# Patient Record
Sex: FEMALE | Race: WHITE | NOT HISPANIC OR LATINO | URBAN - METROPOLITAN AREA
[De-identification: names, ages, dates, MRNs, and addresses within clinical notes are randomized per-mention and may not be internally consistent; named-entity substitution may affect disease eponyms.]

---

## 2017-04-04 ENCOUNTER — AMBULATORY - HEALTHEAST (OUTPATIENT)
Dept: FAMILY MEDICINE | Facility: CLINIC | Age: 22
End: 2017-04-04

## 2017-04-25 ENCOUNTER — COMMUNICATION - HEALTHEAST (OUTPATIENT)
Dept: FAMILY MEDICINE | Facility: CLINIC | Age: 22
End: 2017-04-25

## 2017-07-21 ENCOUNTER — COMMUNICATION - HEALTHEAST (OUTPATIENT)
Dept: SCHEDULING | Facility: CLINIC | Age: 22
End: 2017-07-21

## 2017-07-24 ENCOUNTER — COMMUNICATION - HEALTHEAST (OUTPATIENT)
Dept: FAMILY MEDICINE | Facility: CLINIC | Age: 22
End: 2017-07-24

## 2017-07-25 ENCOUNTER — OFFICE VISIT - HEALTHEAST (OUTPATIENT)
Dept: FAMILY MEDICINE | Facility: CLINIC | Age: 22
End: 2017-07-25

## 2017-07-25 DIAGNOSIS — Z00.00 ROUTINE GENERAL MEDICAL EXAMINATION AT A HEALTH CARE FACILITY: ICD-10-CM

## 2017-07-25 DIAGNOSIS — Z11.3 SCREEN FOR STD (SEXUALLY TRANSMITTED DISEASE): ICD-10-CM

## 2017-07-25 DIAGNOSIS — Z12.4 SCREENING FOR CERVICAL CANCER: ICD-10-CM

## 2017-07-25 ASSESSMENT — MIFFLIN-ST. JEOR: SCORE: 1449.84

## 2017-07-30 ENCOUNTER — COMMUNICATION - HEALTHEAST (OUTPATIENT)
Dept: FAMILY MEDICINE | Facility: CLINIC | Age: 22
End: 2017-07-30

## 2017-07-31 RX ORDER — MONTELUKAST SODIUM 10 MG/1
TABLET ORAL
Qty: 90 TABLET | Refills: 1 | Status: SHIPPED | OUTPATIENT
Start: 2017-07-31

## 2017-08-02 LAB
BKR LAB AP ABNORMAL BLEEDING: NO
BKR LAB AP BIRTH CONTROL/HORMONES: ABNORMAL
BKR LAB AP CERVICAL APPEARANCE: ABNORMAL
BKR LAB AP GYN ADEQUACY: ABNORMAL
BKR LAB AP GYN INTERPRETATION: ABNORMAL
BKR LAB AP HPV REFLEX: ABNORMAL
BKR LAB AP LMP: ABNORMAL
BKR LAB AP PATIENT STATUS: ABNORMAL
BKR LAB AP PREVIOUS ABNORMAL: ABNORMAL
BKR LAB AP PREVIOUS NORMAL: ABNORMAL
HIGH RISK?: NO
INTERPRETING LAB: ABNORMAL
PATH REPORT.COMMENTS IMP SPEC: ABNORMAL
RESULT FLAG (HE HISTORICAL CONVERSION): ABNORMAL

## 2017-08-04 LAB
HPV INTERPRETATION - HISTORICAL: NORMAL
HPV INTERPRETER - HISTORICAL: NORMAL

## 2017-12-06 ENCOUNTER — OFFICE VISIT - HEALTHEAST (OUTPATIENT)
Dept: FAMILY MEDICINE | Facility: CLINIC | Age: 22
End: 2017-12-06

## 2017-12-06 DIAGNOSIS — J32.9 SINUSITIS: ICD-10-CM

## 2017-12-06 DIAGNOSIS — J02.9 SORE THROAT: ICD-10-CM

## 2018-09-29 ENCOUNTER — COMMUNICATION - HEALTHEAST (OUTPATIENT)
Dept: FAMILY MEDICINE | Facility: CLINIC | Age: 23
End: 2018-09-29

## 2018-10-01 ENCOUNTER — COMMUNICATION - HEALTHEAST (OUTPATIENT)
Dept: FAMILY MEDICINE | Facility: CLINIC | Age: 23
End: 2018-10-01

## 2018-10-01 RX ORDER — ETONOGESTREL AND ETHINYL ESTRADIOL VAGINAL RING .015; .12 MG/D; MG/D
RING VAGINAL
Qty: 3 EACH | Refills: 4 | Status: SHIPPED | OUTPATIENT
Start: 2018-10-01

## 2018-11-01 ENCOUNTER — RECORDS - HEALTHEAST (OUTPATIENT)
Dept: ADMINISTRATIVE | Facility: OTHER | Age: 23
End: 2018-11-01

## 2019-02-14 ENCOUNTER — AMBULATORY - HEALTHEAST (OUTPATIENT)
Dept: FAMILY MEDICINE | Facility: CLINIC | Age: 24
End: 2019-02-14

## 2021-05-31 VITALS — HEIGHT: 64 IN | BODY MASS INDEX: 27.47 KG/M2 | WEIGHT: 160.9 LBS

## 2021-05-31 VITALS — BODY MASS INDEX: 27 KG/M2 | WEIGHT: 157.3 LBS

## 2021-06-12 NOTE — PROGRESS NOTES
Assessment/Plan:      Healthy female exam.   1. Contraception  - etonogestrel-ethinyl estradiol (NUVARING) 0.12-0.015 mg/24 hr vaginal ring; INSERT 1 RING INTRAVAGINALLY & LEAVE IN FOR 3WEEKS, THEN REMOVE FOR 1 WEEK BEFORE INSERTING NEW RING  Dispense: 3 each; Refill: 4    2. Screening for cervical cancer  - Gynecologic Cytology (PAP Smear)    3. Screen for STD (sexually transmitted disease)  Routine based on her age.  - Chlamydia trachomatis DNA, AMP Probe    4. Routine general medical examination at a health care facility  The patient is overall doing well and adheres to a healthy diet as well as working routine exercise into her program.  She is up-to-date on her immunizations.  We talked about vitamin D supplementation for bone health.       Subjective:      Bella Blood is a 22 y.o. female who presents for an annual exam.  The patient is doing well without any specific complaints or concerns.  She is due for a Pap smear today and would like a refill on her birth control.      Healthy Habits:   Regular Exercise: Yes  Sunscreen Use: Yes  Healthy Diet: Yes  Dental Visits Regularly: Yes  Seat Belt: Yes  Sexually active: Yes  Colonoscopy: N/A  Lipid Profile: Yes  Glucose Screen: N/A  Prevention of Osteoporosis: No  Last Dexa: N/A      Immunization History   Administered Date(s) Administered     DT (pediatric) 02/23/2006     DTP / HiB 1994, 1994, 1994, 06/02/1995     Dtap 04/15/1999     HPV Quadrivalent 04/23/2007, 06/28/2007, 10/30/2007     Hep B, historic 1994, 1994, 1994     IPV 1994, 1994, 06/02/1995, 04/15/1999     MMR 06/02/1995, 04/15/1999     Meningococcal MCV40 04/23/2007     Td, historic 02/23/2006     Tdap 10/26/2012     Varicella 08/20/1999, 06/28/2007     Immunization status: up to date and documented.    Gynecologic History  Patient's last menstrual period was 07/09/2017.  Contraception: NuvaRing vaginal inserts  Last Pap: 2015. Results were:  normal  Last mammogram: N/A.       OB History   No data available       Current Outpatient Prescriptions   Medication Sig Dispense Refill     etonogestrel-ethinyl estradiol (NUVARING) 0.12-0.015 mg/24 hr vaginal ring INSERT 1 RING INTRAVAGINALLY & LEAVE IN FOR 3WEEKS, THEN REMOVE FOR 1 WEEK BEFORE INSERTING NEW RING 3 each 4     fluticasone (FLONASE) 50 mcg/actuation nasal spray 1 spray into each nostril 2 times a day at 6:00 am and 4:00 pm. 18 g 2     No current facility-administered medications for this visit.      No past medical history on file.  Past Surgical History:   Procedure Laterality Date     TONSILLECTOMY AND ADENOIDECTOMY       Review of patient's allergies indicates no known allergies.  Family History   Problem Relation Age of Onset     Hypertension Mother      Hypertension Father      Allergic rhinitis Father      Diabetes Maternal Grandmother      Diabetes Maternal Grandfather      Cancer Paternal Grandfather      esophageal     Social History     Social History     Marital status: Single     Spouse name: N/A     Number of children: N/A     Years of education: N/A     Occupational History     Not on file.     Social History Main Topics     Smoking status: Never Smoker     Smokeless tobacco: Never Used     Alcohol use Not on file     Drug use: Not on file     Sexual activity: Not on file     Other Topics Concern     Not on file     Social History Narrative       Review of Systems  General:  Denies problem  Eyes: Denies problem  Ears/Nose/Throat: Denies problem  Cardiovascular: Denies problem  Respiratory:  Denies problem  Gastrointestinal:  Denies problem, Genitourinary: Denies problem  Musculoskeletal:  Denies problem  Skin: Denies problem  Neurologic: Denies problem  Psychiatric: Denies problem  Endocrine: Denies problem  Heme/Lymphatic: Denies problem   Allergic/Immunologic: Denies problem        Objective:         Vitals:    07/25/17 1521   BP: 110/72   Pulse: 76   SpO2: 99%   Weight: 160 lb 14.4  "oz (73 kg)   Height: 5' 4\" (1.626 m)     Body mass index is 27.62 kg/(m^2).    Physical Exam:  General Appearance: Alert, cooperative, no distress, appears stated age  Head: Normocephalic, without obvious abnormality, atraumatic  Eyes: PERRL, conjunctiva/corneas clear, EOM's intact  Ears: Normal TM's and external ear canals, both ears  Nose: Nares normal, septum midline,mucosa normal, no drainage  Throat: Lips, mucosa, and tongue normal; teeth and gums normal  Neck: Supple, symmetrical, trachea midline, no adenopathy;  thyroid: not enlarged, symmetric, no tenderness/mass/nodules; no carotid bruit or JVD  Back: Symmetric, no curvature, ROM normal, no CVA tenderness  Lungs: Clear to auscultation bilaterally, respirations unlabored  Breasts: No breast masses, tenderness, asymmetry, or nipple discharge.  Heart: Regular rate and rhythm, S1 and S2 normal, no murmur, rub, or gallop, Abdomen: Soft, non-tender, bowel sounds active all four quadrants,  no masses, no organomegaly  Pelvic:Normally developed genitalia with no external lesions or eruptions. Vagina and cervix show no lesions, inflammation, discharge or tenderness. No cystocele, No rectocele. Uterus normal.  No adnexal mass or tenderness.  Extremities: Extremities normal, atraumatic, no cyanosis or edema  Skin: Skin color, texture, turgor normal, no rashes or lesions  Lymph nodes: Cervical, supraclavicular, and axillary nodes normal  Neurologic: Normal      "

## 2021-06-14 NOTE — PROGRESS NOTES
Chief Complaint   Patient presents with     Sore Throat     x 3 day. 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 No recent fever. Throat pain is main concern.     Review of systems: See history of present illness, otherwise negative.     Current Outpatient Prescriptions   Medication Sig Dispense Refill     etonogestrel-ethinyl estradiol (NUVARING) 0.12-0.015 mg/24 hr vaginal ring INSERT 1 RING INTRAVAGINALLY & LEAVE IN FOR 3WEEKS, THEN REMOVE FOR 1 WEEK BEFORE INSERTING NEW RING 3 each 4     fluticasone (FLONASE) 50 mcg/actuation nasal spray 1 spray into each nostril 2 times a day at 6:00 am and 4:00 pm. 18 g 2     montelukast (SINGULAIR) 10 mg tablet TAKE 1 TABLET BY MOUTH DAILY AT BEDTIME 90 tablet 1     amoxicillin (AMOXIL) 875 MG tablet Take 1 tablet (875 mg total) by mouth 2 (two) times a day for 10 days. 20 tablet 0     viscous lidocaine HC (LIDOCAINE) 2 % Soln viscous solution Take 5 mL by mouth every 3 (three) hours as needed. 100 mL 0     No current facility-administered medications for this visit.        No past medical history on file.   Past Surgical History:   Procedure Laterality Date     TONSILLECTOMY AND ADENOIDECTOMY        Social History     Social History     Marital status: Single     Spouse name: N/A     Number of children: N/A     Years of education: N/A     Social History Main Topics     Smoking status: Never Smoker     Smokeless tobacco: Never Used     Alcohol use None     Drug use: None     Sexual activity: Not Asked     Other Topics Concern     None     Social History Narrative       History   Smoking Status     Never Smoker   Smokeless Tobacco     Never Used      Exam:   Blood pressure 98/50, pulse 77, temperature 98.6  F (37  C), temperature source Oral, resp. rate 16, weight 157 lb 4.8 oz (71.4 kg), SpO2 99 %, not currently breastfeeding.    EXAM:    General: Vital signs reviewed. Patient is in no acute appearing distress. Breathing is non labored appearing. Patient is alert and oriented x 3.   ENT: Ear exam shows clear TMs with no injection, left nasal turbinates are injected and edematous with tender left maxillary sinuses and increased rhinorrhea on left side, no pharyngeal injection with increased post nasal drainage noted.  Neck: supple with tender adenopathy.  Heart: Normal rate and rhythm without murmur  Lungs: Clear to auscultation with good air flow bilaterally.  Skin: warm and dry    Recent Results (from the past 24 hour(s))   Rapid Strep A Screen-Throat   Result Value Ref Range    Rapid Strep A Antigen No Group A Strep detected, presumptive negative No Group A Strep detected, presumptive negative    Results from exam reviewed with patient.    Assessment/Plan   1. Sore throat  Rapid Strep A Screen-Throat    Group A Strep, RNA Direct Detection, Throat    viscous lidocaine HC (LIDOCAINE) 2 % Soln viscous solution   2. Sinusitis  amoxicillin (AMOXIL) 875 MG tablet       There are no Patient Instructions on file for this visit.   Cory Bashir DO

## 2021-06-15 PROBLEM — Z78.9 USES BIRTH CONTROL: Status: ACTIVE | Noted: 2017-04-29

## 2021-08-22 ENCOUNTER — HEALTH MAINTENANCE LETTER (OUTPATIENT)
Age: 26
End: 2021-08-22

## 2021-10-17 ENCOUNTER — HEALTH MAINTENANCE LETTER (OUTPATIENT)
Age: 26
End: 2021-10-17

## 2022-10-02 ENCOUNTER — HEALTH MAINTENANCE LETTER (OUTPATIENT)
Age: 27
End: 2022-10-02

## 2023-10-21 ENCOUNTER — HEALTH MAINTENANCE LETTER (OUTPATIENT)
Age: 28
End: 2023-10-21